# Patient Record
Sex: MALE | Race: WHITE | NOT HISPANIC OR LATINO | URBAN - METROPOLITAN AREA
[De-identification: names, ages, dates, MRNs, and addresses within clinical notes are randomized per-mention and may not be internally consistent; named-entity substitution may affect disease eponyms.]

---

## 2017-02-10 ENCOUNTER — EMERGENCY (EMERGENCY)
Facility: HOSPITAL | Age: 57
LOS: 1 days | End: 2017-02-10
Payer: MEDICARE

## 2017-02-10 PROCEDURE — 99283 EMERGENCY DEPT VISIT LOW MDM: CPT

## 2017-05-30 ENCOUNTER — OUTPATIENT (OUTPATIENT)
Dept: OUTPATIENT SERVICES | Facility: HOSPITAL | Age: 57
LOS: 1 days | End: 2017-05-30

## 2017-07-15 ENCOUNTER — EMERGENCY (EMERGENCY)
Facility: HOSPITAL | Age: 57
LOS: 1 days | End: 2017-07-15
Payer: MEDICARE

## 2017-07-15 PROCEDURE — 99282 EMERGENCY DEPT VISIT SF MDM: CPT

## 2017-08-08 ENCOUNTER — OUTPATIENT (OUTPATIENT)
Dept: OUTPATIENT SERVICES | Facility: HOSPITAL | Age: 57
LOS: 1 days | End: 2017-08-08

## 2017-09-08 ENCOUNTER — OUTPATIENT (OUTPATIENT)
Dept: OUTPATIENT SERVICES | Facility: HOSPITAL | Age: 57
LOS: 1 days | End: 2017-09-08

## 2018-01-23 ENCOUNTER — OUTPATIENT (OUTPATIENT)
Dept: OUTPATIENT SERVICES | Facility: HOSPITAL | Age: 58
LOS: 1 days | End: 2018-01-23

## 2018-03-07 ENCOUNTER — OUTPATIENT (OUTPATIENT)
Dept: OUTPATIENT SERVICES | Facility: HOSPITAL | Age: 58
LOS: 1 days | End: 2018-03-07

## 2018-03-20 ENCOUNTER — OUTPATIENT (OUTPATIENT)
Dept: OUTPATIENT SERVICES | Facility: HOSPITAL | Age: 58
LOS: 1 days | End: 2018-03-20

## 2018-06-15 ENCOUNTER — OUTPATIENT (OUTPATIENT)
Dept: OUTPATIENT SERVICES | Facility: HOSPITAL | Age: 58
LOS: 1 days | End: 2018-06-15

## 2018-06-25 ENCOUNTER — OUTPATIENT (OUTPATIENT)
Dept: OUTPATIENT SERVICES | Facility: HOSPITAL | Age: 58
LOS: 1 days | End: 2018-06-25
Payer: MEDICARE

## 2018-06-25 PROCEDURE — 76856 US EXAM PELVIC COMPLETE: CPT | Mod: 26

## 2018-06-25 PROCEDURE — 76770 US EXAM ABDO BACK WALL COMP: CPT | Mod: 26

## 2018-07-05 ENCOUNTER — OUTPATIENT (OUTPATIENT)
Dept: OUTPATIENT SERVICES | Facility: HOSPITAL | Age: 58
LOS: 1 days | End: 2018-07-05

## 2018-07-30 ENCOUNTER — OUTPATIENT (OUTPATIENT)
Dept: OUTPATIENT SERVICES | Facility: HOSPITAL | Age: 58
LOS: 1 days | End: 2018-07-30

## 2018-08-26 ENCOUNTER — EMERGENCY (EMERGENCY)
Facility: HOSPITAL | Age: 58
LOS: 1 days | End: 2018-08-26
Payer: MEDICARE

## 2018-08-26 PROCEDURE — 99284 EMERGENCY DEPT VISIT MOD MDM: CPT | Mod: 25

## 2018-08-26 PROCEDURE — 99283 EMERGENCY DEPT VISIT LOW MDM: CPT

## 2018-10-24 ENCOUNTER — EMERGENCY (EMERGENCY)
Facility: HOSPITAL | Age: 58
LOS: 1 days | End: 2018-10-24
Payer: MEDICARE

## 2018-10-24 PROCEDURE — 99284 EMERGENCY DEPT VISIT MOD MDM: CPT

## 2018-10-24 PROCEDURE — 70450 CT HEAD/BRAIN W/O DYE: CPT | Mod: 26

## 2018-11-02 ENCOUNTER — EMERGENCY (EMERGENCY)
Facility: HOSPITAL | Age: 58
LOS: 1 days | End: 2018-11-02
Payer: MEDICARE

## 2018-11-02 PROCEDURE — 71045 X-RAY EXAM CHEST 1 VIEW: CPT | Mod: 26

## 2018-11-02 PROCEDURE — 99284 EMERGENCY DEPT VISIT MOD MDM: CPT | Mod: GC

## 2018-11-09 ENCOUNTER — EMERGENCY (EMERGENCY)
Facility: HOSPITAL | Age: 58
LOS: 1 days | End: 2018-11-09
Payer: MEDICARE

## 2018-11-09 PROCEDURE — 70450 CT HEAD/BRAIN W/O DYE: CPT | Mod: 26

## 2018-11-09 PROCEDURE — 99284 EMERGENCY DEPT VISIT MOD MDM: CPT

## 2018-11-09 PROCEDURE — 71046 X-RAY EXAM CHEST 2 VIEWS: CPT | Mod: 26

## 2018-11-09 PROCEDURE — 90792 PSYCH DIAG EVAL W/MED SRVCS: CPT | Mod: GT

## 2018-11-09 PROCEDURE — 74018 RADEX ABDOMEN 1 VIEW: CPT | Mod: 26

## 2018-11-20 ENCOUNTER — OUTPATIENT (OUTPATIENT)
Dept: OUTPATIENT SERVICES | Facility: HOSPITAL | Age: 58
LOS: 1 days | End: 2018-11-20

## 2019-02-07 ENCOUNTER — OUTPATIENT (OUTPATIENT)
Dept: OUTPATIENT SERVICES | Facility: HOSPITAL | Age: 59
LOS: 1 days | End: 2019-02-07

## 2019-02-22 ENCOUNTER — OUTPATIENT (OUTPATIENT)
Dept: OUTPATIENT SERVICES | Facility: HOSPITAL | Age: 59
LOS: 1 days | End: 2019-02-22

## 2019-05-02 ENCOUNTER — OUTPATIENT (OUTPATIENT)
Dept: OUTPATIENT SERVICES | Facility: HOSPITAL | Age: 59
LOS: 1 days | End: 2019-05-02

## 2019-06-05 ENCOUNTER — OUTPATIENT (OUTPATIENT)
Dept: OUTPATIENT SERVICES | Facility: HOSPITAL | Age: 59
LOS: 1 days | End: 2019-06-05

## 2019-07-01 PROBLEM — Z00.00 ENCOUNTER FOR PREVENTIVE HEALTH EXAMINATION: Status: ACTIVE | Noted: 2019-07-01

## 2019-07-09 ENCOUNTER — APPOINTMENT (OUTPATIENT)
Dept: ULTRASOUND IMAGING | Facility: CLINIC | Age: 59
End: 2019-07-09
Payer: MEDICARE

## 2019-07-09 PROCEDURE — 76770 US EXAM ABDO BACK WALL COMP: CPT

## 2019-08-13 ENCOUNTER — OUTPATIENT (OUTPATIENT)
Dept: OUTPATIENT SERVICES | Facility: HOSPITAL | Age: 59
LOS: 1 days | End: 2019-08-13

## 2019-10-29 ENCOUNTER — APPOINTMENT (OUTPATIENT)
Dept: ULTRASOUND IMAGING | Facility: CLINIC | Age: 59
End: 2019-10-29
Payer: MEDICARE

## 2019-10-29 PROCEDURE — 76770 US EXAM ABDO BACK WALL COMP: CPT

## 2019-11-21 ENCOUNTER — OUTPATIENT (OUTPATIENT)
Dept: OUTPATIENT SERVICES | Facility: HOSPITAL | Age: 59
LOS: 1 days | Discharge: HOME | End: 2019-11-21

## 2019-12-06 ENCOUNTER — OUTPATIENT (OUTPATIENT)
Dept: OUTPATIENT SERVICES | Facility: HOSPITAL | Age: 59
LOS: 1 days | End: 2019-12-06

## 2020-01-06 ENCOUNTER — EMERGENCY (EMERGENCY)
Facility: HOSPITAL | Age: 60
LOS: 1 days | End: 2020-01-06
Admitting: EMERGENCY MEDICINE
Payer: MEDICARE

## 2020-01-06 PROCEDURE — 99283 EMERGENCY DEPT VISIT LOW MDM: CPT

## 2020-01-06 PROCEDURE — 71046 X-RAY EXAM CHEST 2 VIEWS: CPT | Mod: 26

## 2020-01-30 ENCOUNTER — APPOINTMENT (OUTPATIENT)
Dept: RADIOLOGY | Facility: CLINIC | Age: 60
End: 2020-01-30
Payer: MEDICARE

## 2020-01-30 PROCEDURE — 71046 X-RAY EXAM CHEST 2 VIEWS: CPT

## 2020-02-06 ENCOUNTER — OUTPATIENT (OUTPATIENT)
Dept: OUTPATIENT SERVICES | Facility: HOSPITAL | Age: 60
LOS: 1 days | End: 2020-02-06
Payer: MEDICARE

## 2020-02-06 PROCEDURE — 74230 X-RAY XM SWLNG FUNCJ C+: CPT | Mod: 26

## 2020-02-28 ENCOUNTER — EMERGENCY (EMERGENCY)
Facility: HOSPITAL | Age: 60
LOS: 1 days | End: 2020-02-28
Admitting: EMERGENCY MEDICINE
Payer: MEDICARE

## 2020-02-28 PROCEDURE — 71045 X-RAY EXAM CHEST 1 VIEW: CPT | Mod: 26

## 2020-02-28 PROCEDURE — 99283 EMERGENCY DEPT VISIT LOW MDM: CPT

## 2020-02-29 ENCOUNTER — EMERGENCY (EMERGENCY)
Facility: HOSPITAL | Age: 60
LOS: 1 days | End: 2020-02-29
Admitting: EMERGENCY MEDICINE
Payer: MEDICARE

## 2020-02-29 PROCEDURE — 99283 EMERGENCY DEPT VISIT LOW MDM: CPT

## 2020-02-29 PROCEDURE — 71046 X-RAY EXAM CHEST 2 VIEWS: CPT | Mod: 26

## 2020-03-04 ENCOUNTER — OUTPATIENT (OUTPATIENT)
Dept: OUTPATIENT SERVICES | Facility: HOSPITAL | Age: 60
LOS: 1 days | End: 2020-03-04

## 2020-08-05 ENCOUNTER — OUTPATIENT (OUTPATIENT)
Dept: OUTPATIENT SERVICES | Facility: HOSPITAL | Age: 60
LOS: 1 days | End: 2020-08-05

## 2020-08-20 ENCOUNTER — OUTPATIENT (OUTPATIENT)
Dept: OUTPATIENT SERVICES | Facility: HOSPITAL | Age: 60
LOS: 1 days | Discharge: HOME | End: 2020-08-20

## 2020-10-08 ENCOUNTER — OUTPATIENT (OUTPATIENT)
Dept: OUTPATIENT SERVICES | Facility: HOSPITAL | Age: 60
LOS: 1 days | End: 2020-10-08
Payer: MEDICARE

## 2020-10-08 PROCEDURE — 74230 X-RAY XM SWLNG FUNCJ C+: CPT | Mod: 26

## 2020-10-19 ENCOUNTER — OUTPATIENT (OUTPATIENT)
Dept: OUTPATIENT SERVICES | Facility: HOSPITAL | Age: 60
LOS: 1 days | End: 2020-10-19

## 2021-01-07 ENCOUNTER — OUTPATIENT (OUTPATIENT)
Dept: OUTPATIENT SERVICES | Facility: HOSPITAL | Age: 61
LOS: 1 days | End: 2021-01-07

## 2021-01-08 ENCOUNTER — OUTPATIENT (OUTPATIENT)
Dept: OUTPATIENT SERVICES | Facility: HOSPITAL | Age: 61
LOS: 1 days | End: 2021-01-08
Payer: MEDICARE

## 2021-01-08 PROCEDURE — 71046 X-RAY EXAM CHEST 2 VIEWS: CPT | Mod: 26

## 2021-01-10 ENCOUNTER — APPOINTMENT (OUTPATIENT)
Dept: DISASTER EMERGENCY | Facility: CLINIC | Age: 61
End: 2021-01-10

## 2021-01-10 DIAGNOSIS — Z01.818 ENCOUNTER FOR OTHER PREPROCEDURAL EXAMINATION: ICD-10-CM

## 2021-01-12 ENCOUNTER — OUTPATIENT (OUTPATIENT)
Dept: OUTPATIENT SERVICES | Facility: HOSPITAL | Age: 61
LOS: 1 days | End: 2021-01-12

## 2021-01-13 ENCOUNTER — OUTPATIENT (OUTPATIENT)
Dept: OUTPATIENT SERVICES | Facility: HOSPITAL | Age: 61
LOS: 1 days | End: 2021-01-13

## 2021-01-19 ENCOUNTER — APPOINTMENT (OUTPATIENT)
Dept: ULTRASOUND IMAGING | Facility: CLINIC | Age: 61
End: 2021-01-19
Payer: MEDICARE

## 2021-01-19 PROCEDURE — 76770 US EXAM ABDO BACK WALL COMP: CPT

## 2021-02-05 ENCOUNTER — OUTPATIENT (OUTPATIENT)
Dept: OUTPATIENT SERVICES | Facility: HOSPITAL | Age: 61
LOS: 1 days | End: 2021-02-05

## 2021-02-10 ENCOUNTER — APPOINTMENT (OUTPATIENT)
Dept: MRI IMAGING | Facility: CLINIC | Age: 61
End: 2021-02-10

## 2021-02-10 ENCOUNTER — APPOINTMENT (OUTPATIENT)
Dept: ULTRASOUND IMAGING | Facility: CLINIC | Age: 61
End: 2021-02-10
Payer: MEDICARE

## 2021-02-10 PROCEDURE — 70551 MRI BRAIN STEM W/O DYE: CPT | Mod: MH

## 2021-02-10 PROCEDURE — 76705 ECHO EXAM OF ABDOMEN: CPT

## 2021-03-05 ENCOUNTER — OUTPATIENT (OUTPATIENT)
Dept: OUTPATIENT SERVICES | Facility: HOSPITAL | Age: 61
LOS: 1 days | End: 2021-03-05

## 2021-03-05 ENCOUNTER — INPATIENT (INPATIENT)
Facility: HOSPITAL | Age: 61
LOS: 4 days | Discharge: HOME | End: 2021-03-10
Payer: MEDICARE

## 2021-03-05 PROCEDURE — 99284 EMERGENCY DEPT VISIT MOD MDM: CPT | Mod: CS

## 2021-03-05 PROCEDURE — 93010 ELECTROCARDIOGRAM REPORT: CPT

## 2021-03-05 PROCEDURE — 71045 X-RAY EXAM CHEST 1 VIEW: CPT | Mod: 26

## 2021-03-06 ENCOUNTER — OUTPATIENT (OUTPATIENT)
Dept: OUTPATIENT SERVICES | Facility: HOSPITAL | Age: 61
LOS: 1 days | End: 2021-03-06

## 2021-03-07 ENCOUNTER — OUTPATIENT (OUTPATIENT)
Dept: OUTPATIENT SERVICES | Facility: HOSPITAL | Age: 61
LOS: 1 days | End: 2021-03-07

## 2021-03-08 ENCOUNTER — OUTPATIENT (OUTPATIENT)
Dept: OUTPATIENT SERVICES | Facility: HOSPITAL | Age: 61
LOS: 1 days | End: 2021-03-08

## 2021-03-08 PROCEDURE — 74230 X-RAY XM SWLNG FUNCJ C+: CPT | Mod: 26

## 2021-03-09 ENCOUNTER — OUTPATIENT (OUTPATIENT)
Dept: OUTPATIENT SERVICES | Facility: HOSPITAL | Age: 61
LOS: 1 days | End: 2021-03-09

## 2021-03-10 ENCOUNTER — OUTPATIENT (OUTPATIENT)
Dept: OUTPATIENT SERVICES | Facility: HOSPITAL | Age: 61
LOS: 1 days | End: 2021-03-10

## 2021-05-18 ENCOUNTER — OUTPATIENT (OUTPATIENT)
Dept: OUTPATIENT SERVICES | Facility: HOSPITAL | Age: 61
LOS: 1 days | End: 2021-05-18

## 2021-08-04 ENCOUNTER — OUTPATIENT (OUTPATIENT)
Dept: OUTPATIENT SERVICES | Facility: HOSPITAL | Age: 61
LOS: 1 days | End: 2021-08-04

## 2021-08-20 ENCOUNTER — OUTPATIENT (OUTPATIENT)
Dept: OUTPATIENT SERVICES | Facility: HOSPITAL | Age: 61
LOS: 1 days | End: 2021-08-20

## 2021-10-18 ENCOUNTER — OUTPATIENT (OUTPATIENT)
Dept: OUTPATIENT SERVICES | Facility: HOSPITAL | Age: 61
LOS: 1 days | End: 2021-10-18

## 2021-10-20 ENCOUNTER — OUTPATIENT (OUTPATIENT)
Dept: OUTPATIENT SERVICES | Facility: HOSPITAL | Age: 61
LOS: 1 days | End: 2021-10-20

## 2021-10-20 ENCOUNTER — OUTPATIENT (OUTPATIENT)
Dept: OUTPATIENT SERVICES | Facility: HOSPITAL | Age: 61
LOS: 1 days | Discharge: HOME | End: 2021-10-20

## 2022-02-07 ENCOUNTER — APPOINTMENT (OUTPATIENT)
Dept: ULTRASOUND IMAGING | Facility: CLINIC | Age: 62
End: 2022-02-07
Payer: MEDICARE

## 2022-02-07 PROCEDURE — 93975 VASCULAR STUDY: CPT

## 2022-03-02 ENCOUNTER — OUTPATIENT (OUTPATIENT)
Dept: OUTPATIENT SERVICES | Facility: HOSPITAL | Age: 62
LOS: 1 days | End: 2022-03-02

## 2022-03-02 DIAGNOSIS — F31.9 BIPOLAR DISORDER, UNSPECIFIED: ICD-10-CM

## 2022-06-02 ENCOUNTER — OUTPATIENT (OUTPATIENT)
Dept: OUTPATIENT SERVICES | Facility: HOSPITAL | Age: 62
LOS: 1 days | End: 2022-06-02

## 2022-06-02 DIAGNOSIS — F63.9 IMPULSE DISORDER, UNSPECIFIED: ICD-10-CM

## 2022-06-24 ENCOUNTER — EMERGENCY (EMERGENCY)
Facility: HOSPITAL | Age: 62
LOS: 1 days | Discharge: HOME | End: 2022-06-24
Admitting: EMERGENCY MEDICINE
Payer: MEDICARE

## 2022-06-24 DIAGNOSIS — S05.02XA INJURY OF CONJUNCTIVA AND CORNEAL ABRASION WITHOUT FOREIGN BODY, LEFT EYE, INITIAL ENCOUNTER: ICD-10-CM

## 2022-06-24 DIAGNOSIS — F73 PROFOUND INTELLECTUAL DISABILITIES: ICD-10-CM

## 2022-06-24 DIAGNOSIS — S09.90XA UNSPECIFIED INJURY OF HEAD, INITIAL ENCOUNTER: ICD-10-CM

## 2022-06-24 DIAGNOSIS — S09.8XXA OTHER SPECIFIED INJURIES OF HEAD, INITIAL ENCOUNTER: ICD-10-CM

## 2022-06-24 DIAGNOSIS — E04.1 NONTOXIC SINGLE THYROID NODULE: ICD-10-CM
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
## 2022-06-24 PROCEDURE — 70486 CT MAXILLOFACIAL W/O DYE: CPT | Mod: 26,MA

## 2022-06-24 PROCEDURE — 72125 CT NECK SPINE W/O DYE: CPT | Mod: 26,MA

## 2022-06-24 PROCEDURE — 70450 CT HEAD/BRAIN W/O DYE: CPT | Mod: 26,MA

## 2022-06-24 PROCEDURE — 99284 EMERGENCY DEPT VISIT MOD MDM: CPT | Mod: FS

## 2022-07-09 ENCOUNTER — OUTPATIENT (OUTPATIENT)
Dept: OUTPATIENT SERVICES | Facility: HOSPITAL | Age: 62
LOS: 1 days | End: 2022-07-09

## 2022-07-09 DIAGNOSIS — F63.9 IMPULSE DISORDER, UNSPECIFIED: ICD-10-CM

## 2022-08-10 ENCOUNTER — APPOINTMENT (OUTPATIENT)
Dept: ULTRASOUND IMAGING | Facility: CLINIC | Age: 62
End: 2022-08-10

## 2022-08-10 PROCEDURE — 76536 US EXAM OF HEAD AND NECK: CPT

## 2023-02-01 ENCOUNTER — APPOINTMENT (OUTPATIENT)
Dept: ULTRASOUND IMAGING | Facility: CLINIC | Age: 63
End: 2023-02-01
Payer: MEDICARE

## 2023-02-01 PROCEDURE — 76536 US EXAM OF HEAD AND NECK: CPT

## 2023-03-06 ENCOUNTER — APPOINTMENT (OUTPATIENT)
Dept: ULTRASOUND IMAGING | Facility: CLINIC | Age: 63
End: 2023-03-06

## 2023-05-11 ENCOUNTER — APPOINTMENT (OUTPATIENT)
Dept: ULTRASOUND IMAGING | Facility: CLINIC | Age: 63
End: 2023-05-11

## 2023-05-31 ENCOUNTER — OUTPATIENT (OUTPATIENT)
Dept: OUTPATIENT SERVICES | Facility: HOSPITAL | Age: 63
LOS: 1 days | End: 2023-05-31
Payer: MEDICARE

## 2023-05-31 VITALS
RESPIRATION RATE: 18 BRPM | HEIGHT: 64.5 IN | SYSTOLIC BLOOD PRESSURE: 108 MMHG | WEIGHT: 158.95 LBS | TEMPERATURE: 99 F | OXYGEN SATURATION: 97 % | DIASTOLIC BLOOD PRESSURE: 73 MMHG | HEART RATE: 67 BPM

## 2023-05-31 DIAGNOSIS — Z98.890 OTHER SPECIFIED POSTPROCEDURAL STATES: Chronic | ICD-10-CM

## 2023-05-31 DIAGNOSIS — K05.6 PERIODONTAL DISEASE, UNSPECIFIED: ICD-10-CM

## 2023-05-31 DIAGNOSIS — Z01.818 ENCOUNTER FOR OTHER PREPROCEDURAL EXAMINATION: ICD-10-CM

## 2023-05-31 DIAGNOSIS — K02.62 DENTAL CARIES ON SMOOTH SURFACE PENETRATING INTO DENTIN: ICD-10-CM

## 2023-05-31 LAB
ANION GAP SERPL CALC-SCNC: 13 MMOL/L — SIGNIFICANT CHANGE UP (ref 5–17)
BUN SERPL-MCNC: 19 MG/DL — SIGNIFICANT CHANGE UP (ref 7–23)
CALCIUM SERPL-MCNC: 10.6 MG/DL — HIGH (ref 8.4–10.5)
CHLORIDE SERPL-SCNC: 103 MMOL/L — SIGNIFICANT CHANGE UP (ref 96–108)
CO2 SERPL-SCNC: 22 MMOL/L — SIGNIFICANT CHANGE UP (ref 22–31)
CREAT SERPL-MCNC: 0.89 MG/DL — SIGNIFICANT CHANGE UP (ref 0.5–1.3)
EGFR: 96 ML/MIN/1.73M2 — SIGNIFICANT CHANGE UP
GLUCOSE SERPL-MCNC: 78 MG/DL — SIGNIFICANT CHANGE UP (ref 70–99)
HCT VFR BLD CALC: 47.4 % — SIGNIFICANT CHANGE UP (ref 39–50)
HGB BLD-MCNC: 15.5 G/DL — SIGNIFICANT CHANGE UP (ref 13–17)
MCHC RBC-ENTMCNC: 31.5 PG — SIGNIFICANT CHANGE UP (ref 27–34)
MCHC RBC-ENTMCNC: 32.7 GM/DL — SIGNIFICANT CHANGE UP (ref 32–36)
MCV RBC AUTO: 96.3 FL — SIGNIFICANT CHANGE UP (ref 80–100)
NRBC # BLD: 0 /100 WBCS — SIGNIFICANT CHANGE UP (ref 0–0)
PLATELET # BLD AUTO: 201 K/UL — SIGNIFICANT CHANGE UP (ref 150–400)
POTASSIUM SERPL-MCNC: 3.9 MMOL/L — SIGNIFICANT CHANGE UP (ref 3.5–5.3)
POTASSIUM SERPL-SCNC: 3.9 MMOL/L — SIGNIFICANT CHANGE UP (ref 3.5–5.3)
RBC # BLD: 4.92 M/UL — SIGNIFICANT CHANGE UP (ref 4.2–5.8)
RBC # FLD: 12.7 % — SIGNIFICANT CHANGE UP (ref 10.3–14.5)
SODIUM SERPL-SCNC: 138 MMOL/L — SIGNIFICANT CHANGE UP (ref 135–145)
WBC # BLD: 8.06 K/UL — SIGNIFICANT CHANGE UP (ref 3.8–10.5)
WBC # FLD AUTO: 8.06 K/UL — SIGNIFICANT CHANGE UP (ref 3.8–10.5)

## 2023-05-31 PROCEDURE — 80048 BASIC METABOLIC PNL TOTAL CA: CPT

## 2023-05-31 PROCEDURE — 85027 COMPLETE CBC AUTOMATED: CPT

## 2023-05-31 PROCEDURE — G0463: CPT

## 2023-05-31 RX ORDER — SODIUM CHLORIDE 9 MG/ML
3 INJECTION INTRAMUSCULAR; INTRAVENOUS; SUBCUTANEOUS EVERY 8 HOURS
Refills: 0 | Status: DISCONTINUED | OUTPATIENT
Start: 2023-06-21 | End: 2023-07-05

## 2023-05-31 RX ORDER — LIDOCAINE HCL 20 MG/ML
0.2 VIAL (ML) INJECTION ONCE
Refills: 0 | Status: DISCONTINUED | OUTPATIENT
Start: 2023-06-21 | End: 2023-07-05

## 2023-05-31 NOTE — H&P PST ADULT - NSICDXPASTMEDICALHX_GEN_ALL_CORE_FT
PAST MEDICAL HISTORY:  Autism     Bipolar disorder     BPH (benign prostatic hyperplasia)     Dental caries on smooth surface penetrating into dentin     Impulse control disorder     Lyme disease     Moderate intellectual disability      PAST MEDICAL HISTORY:  Autism     Bipolar disorder     BPH (benign prostatic hyperplasia)     Dental caries on smooth surface penetrating into dentin     Impulse control disorder     Lyme disease     Moderate intellectual disability     Multiple thyroid nodules     Perforated tympanic membrane, right

## 2023-05-31 NOTE — H&P PST ADULT - ASSESSMENT
DASI score: 5.72 mets   DASI activity: able to climb 1 flight, walk 4 blocks  Loose teeth or denture: no

## 2023-05-31 NOTE — H&P PST ADULT - NSANTHOSAYNRD_GEN_A_CORE
No. MINDA screening performed.  STOP BANG Legend: 0-2 = LOW Risk; 3-4 = INTERMEDIATE Risk; 5-8 = HIGH Risk

## 2023-05-31 NOTE — H&P PST ADULT - HISTORY OF PRESENT ILLNESS
64 yo M with PMHx significant for Intellectual Disability, Autism, Impulse Control Disorder, Bipolar Disorder, BPH, and Lyme disease who presents to [facility] prior to scheduled Comprehensive Dental Treatment under Anesthesia on 6/21/23.  62 yo M with PMHx significant for Intellectual Disability, Autism, Impulse Control Disorder, Bipolar Disorder, BPH, Thyroid nodules, sp Colonoscopy, and Lyme disease who presents to PST prior to scheduled Comprehensive Dental Treatment under Anesthesia on 6/21/23.  64 yo M with PMHx significant for Intellectual Disability, Autism, Impulse Control Disorder, Bipolar Disorder, BPH, Thyroid nodules, sp Colonoscopy, and Lyme disease who presents to PST prior to scheduled Comprehensive Dental Treatment under Anesthesia on 6/21/23.     Of note, patient is on thickened liquid diet; CXR prescription faxed to [facility] ().  62 yo M with PMHx significant for Intellectual Disability, Autism, Impulse Control Disorder, Bipolar Disorder, BPH, Thyroid nodules, sp Colonoscopy, and Lyme disease who presents to PST prior to scheduled Comprehensive Dental Treatment under Anesthesia on 6/21/23.     Of note, patient is on thickened liquid diet; CXR prescription faxed to [facility] () and confirmed it will be done prior to dental procedure.

## 2023-06-01 RX ORDER — CEFAZOLIN SODIUM 1 G
2000 VIAL (EA) INJECTION ONCE
Refills: 0 | Status: COMPLETED | OUTPATIENT
Start: 2023-06-21 | End: 2023-06-21

## 2023-06-08 PROBLEM — F84.0 AUTISTIC DISORDER: Chronic | Status: ACTIVE | Noted: 2023-05-31

## 2023-06-08 PROBLEM — N40.0 BENIGN PROSTATIC HYPERPLASIA WITHOUT LOWER URINARY TRACT SYMPTOMS: Chronic | Status: ACTIVE | Noted: 2023-05-31

## 2023-06-08 PROBLEM — K02.62 DENTAL CARIES ON SMOOTH SURFACE PENETRATING INTO DENTIN: Chronic | Status: ACTIVE | Noted: 2023-05-31

## 2023-06-08 PROBLEM — F31.9 BIPOLAR DISORDER, UNSPECIFIED: Chronic | Status: ACTIVE | Noted: 2023-05-31

## 2023-06-08 PROBLEM — H72.91 UNSPECIFIED PERFORATION OF TYMPANIC MEMBRANE, RIGHT EAR: Chronic | Status: ACTIVE | Noted: 2023-05-31

## 2023-06-08 PROBLEM — F71 MODERATE INTELLECTUAL DISABILITIES: Chronic | Status: ACTIVE | Noted: 2023-05-31

## 2023-06-08 PROBLEM — F63.9 IMPULSE DISORDER, UNSPECIFIED: Chronic | Status: ACTIVE | Noted: 2023-05-31

## 2023-06-08 PROBLEM — E04.2 NONTOXIC MULTINODULAR GOITER: Chronic | Status: ACTIVE | Noted: 2023-05-31

## 2023-06-08 PROBLEM — A69.20 LYME DISEASE, UNSPECIFIED: Chronic | Status: ACTIVE | Noted: 2023-05-31

## 2023-06-09 ENCOUNTER — APPOINTMENT (OUTPATIENT)
Dept: RADIOLOGY | Facility: CLINIC | Age: 63
End: 2023-06-09
Payer: MEDICARE

## 2023-06-09 PROCEDURE — 71046 X-RAY EXAM CHEST 2 VIEWS: CPT

## 2023-06-20 ENCOUNTER — TRANSCRIPTION ENCOUNTER (OUTPATIENT)
Age: 63
End: 2023-06-20

## 2023-06-21 ENCOUNTER — OUTPATIENT (OUTPATIENT)
Dept: OUTPATIENT SERVICES | Facility: HOSPITAL | Age: 63
LOS: 1 days | End: 2023-06-21
Payer: MEDICARE

## 2023-06-21 ENCOUNTER — TRANSCRIPTION ENCOUNTER (OUTPATIENT)
Age: 63
End: 2023-06-21

## 2023-06-21 VITALS
HEART RATE: 83 BPM | TEMPERATURE: 97 F | DIASTOLIC BLOOD PRESSURE: 88 MMHG | SYSTOLIC BLOOD PRESSURE: 147 MMHG | OXYGEN SATURATION: 98 % | RESPIRATION RATE: 16 BRPM

## 2023-06-21 VITALS
SYSTOLIC BLOOD PRESSURE: 123 MMHG | WEIGHT: 143.3 LBS | TEMPERATURE: 98 F | RESPIRATION RATE: 18 BRPM | HEART RATE: 87 BPM | OXYGEN SATURATION: 99 % | DIASTOLIC BLOOD PRESSURE: 76 MMHG | HEIGHT: 74.02 IN

## 2023-06-21 DIAGNOSIS — K02.62 DENTAL CARIES ON SMOOTH SURFACE PENETRATING INTO DENTIN: ICD-10-CM

## 2023-06-21 DIAGNOSIS — K05.6 PERIODONTAL DISEASE, UNSPECIFIED: ICD-10-CM

## 2023-06-21 DIAGNOSIS — Z98.890 OTHER SPECIFIED POSTPROCEDURAL STATES: Chronic | ICD-10-CM

## 2023-06-21 PROCEDURE — C1889: CPT

## 2023-06-21 PROCEDURE — C9399: CPT

## 2023-06-21 PROCEDURE — D1110: CPT

## 2023-06-21 PROCEDURE — D7210: CPT

## 2023-06-21 DEVICE — SURGICEL 4 X 8": Type: IMPLANTABLE DEVICE | Status: FUNCTIONAL

## 2023-06-21 RX ORDER — OXYCODONE HYDROCHLORIDE 5 MG/1
10 TABLET ORAL ONCE
Refills: 0 | Status: DISCONTINUED | OUTPATIENT
Start: 2023-06-21 | End: 2023-06-21

## 2023-06-21 RX ORDER — ACETAMINOPHEN 500 MG
100 TABLET ORAL
Qty: 0 | Refills: 0 | DISCHARGE
Start: 2023-06-21

## 2023-06-21 RX ORDER — FENTANYL CITRATE 50 UG/ML
50 INJECTION INTRAVENOUS
Refills: 0 | Status: DISCONTINUED | OUTPATIENT
Start: 2023-06-21 | End: 2023-06-21

## 2023-06-21 RX ORDER — OXYCODONE HYDROCHLORIDE 5 MG/1
5 TABLET ORAL ONCE
Refills: 0 | Status: DISCONTINUED | OUTPATIENT
Start: 2023-06-21 | End: 2023-06-21

## 2023-06-21 RX ORDER — CEFAZOLIN SODIUM 1 G
2 VIAL (EA) INJECTION
Qty: 0 | Refills: 0 | DISCHARGE
Start: 2023-06-21

## 2023-06-21 RX ORDER — ACETAMINOPHEN 500 MG
1000 TABLET ORAL ONCE
Refills: 0 | Status: DISCONTINUED | OUTPATIENT
Start: 2023-06-21 | End: 2023-06-21

## 2023-06-21 RX ORDER — ONDANSETRON 8 MG/1
4 TABLET, FILM COATED ORAL ONCE
Refills: 0 | Status: DISCONTINUED | OUTPATIENT
Start: 2023-06-21 | End: 2023-06-21

## 2023-06-21 NOTE — ASU DISCHARGE PLAN (ADULT/PEDIATRIC) - ASU DC SPECIAL INSTRUCTIONSFT
comprehensive dental treatment under general anesthesia stage 1: exam, xrays, periodontal treatment and extractions performed three to the lower anterior and two to the upper left and upper right     no straw for two days and keep head elevated for the nest two days and nights, return to activities on Friday    see Dr Lo on Monday 6/26 at 11:45 am for a follow up at [facility] in [place], appt is set    tylenol prn pain and take tylenol prn pain      patient has extensive dental decay     if there is any bleeding, put pressure with gauze for 20 minutes     patient will need Stage 2 in the near future     patient does have extensive dental caries

## 2023-06-21 NOTE — ASU PATIENT PROFILE, ADULT - NSTOBACCONEVERSMOKERY/N_GEN_A
"1) Jacque \"Low back pain stretch video\".    2) Cut down to 24 beers per week.    3) Exercise 3-5 times per week for at least 30 minutes.  "
No

## 2023-06-21 NOTE — ASU DISCHARGE PLAN (ADULT/PEDIATRIC) - NS MD DC FALL RISK RISK
For information on Fall & Injury Prevention, visit: [email or web address] OR  [email or web address] OR  https://www.cdc.gov/steadi/patient.html

## 2023-06-21 NOTE — ASU PATIENT PROFILE, ADULT - FALL HARM RISK - HARM RISK INTERVENTIONS
Assistance with ambulation/Assistance OOB with selected safe patient handling equipment/Communicate Risk of Fall with Harm to all staff/Discuss with provider need for PT consult/Monitor for mental status changes/Monitor gait and stability/Move patient closer to nurses' station/Reinforce activity limits and safety measures with patient and family/Reorient to person, place and time as needed/Tailored Fall Risk Interventions/Toileting schedule using arm’s reach rule for commode and bathroom/Use of alarms - bed, chair and/or voice tab/Visual Cue: Yellow wristband and red socks/Bed in lowest position, wheels locked, appropriate side rails in place/Call bell, personal items and telephone in reach/Instruct patient to call for assistance before getting out of bed or chair/Non-slip footwear when patient is out of bed/[place] to call system/Physically safe environment - no spills, clutter or unnecessary equipment/Purposeful Proactive Rounding/Room/bathroom lighting operational, light cord in reach

## 2023-06-21 NOTE — ASU PATIENT PROFILE, ADULT - PRO INTERPRETER NEED 2
How Severe Is Your Skin Lesion?: moderate Have Your Skin Lesions Been Treated?: not been treated Is This A New Presentation, Or A Follow-Up?: Growth English

## 2023-06-21 NOTE — ASU PATIENT PROFILE, ADULT - NSICDXPASTMEDICALHX_GEN_ALL_CORE_FT
PAST MEDICAL HISTORY:  Autism     Bipolar disorder     BPH (benign prostatic hyperplasia)     Dental caries on smooth surface penetrating into dentin     Impulse control disorder     Lyme disease     Moderate intellectual disability     Multiple thyroid nodules     Perforated tympanic membrane, right

## 2023-07-14 ENCOUNTER — OUTPATIENT (OUTPATIENT)
Dept: OUTPATIENT SERVICES | Facility: HOSPITAL | Age: 63
LOS: 1 days | End: 2023-07-14
Payer: MEDICARE

## 2023-07-14 VITALS
HEIGHT: 63 IN | WEIGHT: 154.98 LBS | TEMPERATURE: 99 F | RESPIRATION RATE: 15 BRPM | SYSTOLIC BLOOD PRESSURE: 114 MMHG | HEART RATE: 80 BPM | OXYGEN SATURATION: 97 % | DIASTOLIC BLOOD PRESSURE: 72 MMHG

## 2023-07-14 DIAGNOSIS — Z98.890 OTHER SPECIFIED POSTPROCEDURAL STATES: Chronic | ICD-10-CM

## 2023-07-14 DIAGNOSIS — K02.62 DENTAL CARIES ON SMOOTH SURFACE PENETRATING INTO DENTIN: ICD-10-CM

## 2023-07-14 DIAGNOSIS — K05.6 PERIODONTAL DISEASE, UNSPECIFIED: ICD-10-CM

## 2023-07-14 DIAGNOSIS — Z01.818 ENCOUNTER FOR OTHER PREPROCEDURAL EXAMINATION: ICD-10-CM

## 2023-07-14 PROCEDURE — G0463: CPT

## 2023-07-14 NOTE — H&P PST ADULT - NS HP PST LATEX ALLERGY
Next appt 4/15/2020  Last appt 1/14/2020    Refill request for   Disp Refills Start End    meloxicam (MOBIC) 15 MG tablet 30 tablet 0 1/14/2020     Sig - Route: Take 1 tablet by mouth daily       Disp Refills Start End    ADVAIR DISKUS 250-50 MCG/DOSE inhaler 60 each 0 1/7/2020     Sig: INHALE 1 PUFF BY MOUTH TWICE DAILY      Refill unable to be completed per standing protocol due to; Allergy/interaction  Orders pended, and routed to provider for approval.   Please route any notes back to your nursing pool via patient call NOT Rx Auth.  Thank you, Refill Center Staff        Also requested:   Disp Refills Start End    pantoprazole (PROTONIX) 40 MG tablet 90 tablet 0 12/4/2019     Sig - Route: TAKE 1 TABLET BY MOUTH DAILY       Refilled per protocol.   No

## 2023-07-14 NOTE — H&P PST ADULT - HISTORY OF PRESENT ILLNESS
64 yo M with PMHx significant for Intellectual Disability, Autism, Impulse Control Disorder, Bipolar Disorder, BPH, Thyroid nodules, sp Colonoscopy, and Lyme disease who presents to PST prior to scheduled Comprehensive Dental Treatment under Anesthesia on 6/21/23.     Of note, patient is on thickened liquid diet; CXR prescription faxed to [facility] () and confirmed it will be done prior to dental procedure.  64 yo M with PMHx significant for Intellectual Disability, Autism, Impulse Control Disorder, Bipolar Disorder, BPH, Thyroid nodules, sp Colonoscopy, and Lyme disease who presents to [facility] prior to scheduled Comprehensive Dental Treatment under Anesthesia on 6/21/23.   7/14/2023: addendum: pt presents to [facility] accompanied by group [place] staff, that above mentioned procedure was done without complication on 6/21/2023, now pt is scheduled for stage II dental procedure on 7/20.  discussed case with Dr. Munoz, no need for another set of blood work nor CXR.*    *legal guardian is sister Esme Schroeder  [phone] who will give telephone consent for anesthesia. *

## 2023-07-14 NOTE — H&P PST ADULT - BIRTH SEX
Genitourinary: Negative for dysuria, flank pain, frequency and urgency. Musculoskeletal: Positive for arthralgias. Negative for back pain, joint swelling and myalgias. Skin: Positive for wound. Negative for pallor and rash. Allergic/Immunologic: Negative for environmental allergies and food allergies. Neurological: Negative for dizziness, weakness, numbness and headaches. Hematological: Negative for adenopathy. Does not bruise/bleed easily. Psychiatric/Behavioral: Negative for agitation, confusion and suicidal ideas. PAST MEDICAL HISTORY   History reviewed. No pertinent past medical history. SURGICAL HISTORY     Patient  has a past surgical history that includes Appendectomy and Small intestine surgery. CURRENT MEDICATIONS       Previous Medications    ATORVASTATIN (LIPITOR) 20 MG TABLET    Take 1 tablet by mouth daily    MULTIPLE VITAMINS-MINERALS (THERAPEUTIC MULTIVITAMIN-MINERALS) TABLET    Take 1 tablet by mouth daily as needed. ALLERGIES     Patient is is allergic to asa [aspirin] and pcn [penicillins]. FAMILY HISTORY     Patient'sfamily history includes Diabetes in his father. SOCIAL HISTORY     Patient  reports that he has been smoking Cigarettes. He has been smoking about 0.25 packs per day. His smokeless tobacco use includes Snuff. He reports that he does not drink alcohol or use drugs. PHYSICAL EXAM     ED TRIAGE VITALS  BP: 136/77, Temp: 98.2 °F (36.8 °C), Pulse: 80, Resp: 16, SpO2: 96 %  Physical Exam   Constitutional: He is oriented to person, place, and time. He appears well-developed and well-nourished. No distress. HENT:   Head: Normocephalic. Nose: Nose normal.   Mouth/Throat: Oropharynx is clear and moist.   Eyes: Right eye exhibits no discharge. Left eye exhibits no discharge. No scleral icterus. Neck: Normal range of motion. Cardiovascular: Normal rate, regular rhythm, normal heart sounds and intact distal pulses.     Pulmonary/Chest: Effort normal
Male

## 2023-07-25 ENCOUNTER — TRANSCRIPTION ENCOUNTER (OUTPATIENT)
Age: 63
End: 2023-07-25

## 2023-07-26 ENCOUNTER — OUTPATIENT (OUTPATIENT)
Dept: INPATIENT UNIT | Facility: HOSPITAL | Age: 63
LOS: 1 days | End: 2023-07-26
Payer: MEDICARE

## 2023-07-26 ENCOUNTER — TRANSCRIPTION ENCOUNTER (OUTPATIENT)
Age: 63
End: 2023-07-26

## 2023-07-26 VITALS
SYSTOLIC BLOOD PRESSURE: 119 MMHG | RESPIRATION RATE: 18 BRPM | HEART RATE: 86 BPM | OXYGEN SATURATION: 97 % | TEMPERATURE: 98 F | HEIGHT: 60.98 IN | DIASTOLIC BLOOD PRESSURE: 68 MMHG | WEIGHT: 154.98 LBS

## 2023-07-26 VITALS
DIASTOLIC BLOOD PRESSURE: 62 MMHG | OXYGEN SATURATION: 94 % | HEART RATE: 99 BPM | RESPIRATION RATE: 20 BRPM | SYSTOLIC BLOOD PRESSURE: 140 MMHG | TEMPERATURE: 97 F

## 2023-07-26 DIAGNOSIS — K05.6 PERIODONTAL DISEASE, UNSPECIFIED: ICD-10-CM

## 2023-07-26 DIAGNOSIS — Z98.890 OTHER SPECIFIED POSTPROCEDURAL STATES: Chronic | ICD-10-CM

## 2023-07-26 DIAGNOSIS — K02.62 DENTAL CARIES ON SMOOTH SURFACE PENETRATING INTO DENTIN: ICD-10-CM

## 2023-07-26 PROCEDURE — C9399: CPT

## 2023-07-26 PROCEDURE — C1889: CPT

## 2023-07-26 PROCEDURE — D7210: CPT

## 2023-07-26 DEVICE — SURGICEL 2 X 14": Type: IMPLANTABLE DEVICE | Status: FUNCTIONAL

## 2023-07-26 RX ORDER — CEFAZOLIN SODIUM 1 G
2000 VIAL (EA) INJECTION ONCE
Refills: 0 | Status: COMPLETED | OUTPATIENT
Start: 2023-07-26 | End: 2023-07-26

## 2023-07-26 RX ORDER — OXYCODONE HYDROCHLORIDE 5 MG/1
10 TABLET ORAL ONCE
Refills: 0 | Status: DISCONTINUED | OUTPATIENT
Start: 2023-07-26 | End: 2023-07-26

## 2023-07-26 RX ORDER — FENTANYL CITRATE 50 UG/ML
50 INJECTION INTRAVENOUS
Refills: 0 | Status: DISCONTINUED | OUTPATIENT
Start: 2023-07-26 | End: 2023-07-26

## 2023-07-26 RX ORDER — ONDANSETRON 8 MG/1
4 TABLET, FILM COATED ORAL ONCE
Refills: 0 | Status: DISCONTINUED | OUTPATIENT
Start: 2023-07-26 | End: 2023-07-26

## 2023-07-26 RX ORDER — SODIUM CHLORIDE 9 MG/ML
3 INJECTION INTRAMUSCULAR; INTRAVENOUS; SUBCUTANEOUS EVERY 8 HOURS
Refills: 0 | Status: DISCONTINUED | OUTPATIENT
Start: 2023-07-26 | End: 2023-07-26

## 2023-07-26 RX ORDER — ACETAMINOPHEN 500 MG
1000 TABLET ORAL ONCE
Refills: 0 | Status: DISCONTINUED | OUTPATIENT
Start: 2023-07-26 | End: 2023-07-26

## 2023-07-26 RX ORDER — OXYCODONE HYDROCHLORIDE 5 MG/1
5 TABLET ORAL ONCE
Refills: 0 | Status: DISCONTINUED | OUTPATIENT
Start: 2023-07-26 | End: 2023-07-26

## 2023-07-26 NOTE — ASU PATIENT PROFILE, ADULT - FALL HARM RISK - HARM RISK INTERVENTIONS
Assistance with ambulation/Assistance OOB with selected safe patient handling equipment/Communicate Risk of Fall with Harm to all staff/Discuss with provider need for PT consult/Monitor for mental status changes/Monitor gait and stability/Move patient closer to nurses' station/Reinforce activity limits and safety measures with patient and family/Reorient to person, place and time as needed/Tailored Fall Risk Interventions/Toileting schedule using arm’s reach rule for commode and bathroom/Use of alarms - bed, chair and/or voice tab/Visual Cue: Yellow wristband and red socks/Bed in lowest position, wheels locked, appropriate side rails in place/Call bell, personal items and telephone in reach/Instruct patient to call for assistance before getting out of bed or chair/Non-slip footwear when patient is out of bed/[place] to call system/Physically safe environment - no spills, clutter or unnecessary equipment/Purposeful Proactive Rounding/Room/bathroom lighting operational, light cord in reach

## 2023-08-15 ENCOUNTER — APPOINTMENT (OUTPATIENT)
Dept: CT IMAGING | Facility: CLINIC | Age: 63
End: 2023-08-15

## 2023-08-17 ENCOUNTER — RESULT REVIEW (OUTPATIENT)
Age: 63
End: 2023-08-17

## 2023-08-17 ENCOUNTER — APPOINTMENT (OUTPATIENT)
Dept: ULTRASOUND IMAGING | Facility: CLINIC | Age: 63
End: 2023-08-17
Payer: MEDICARE

## 2023-08-17 PROCEDURE — 76536 US EXAM OF HEAD AND NECK: CPT

## 2023-09-07 ENCOUNTER — OUTPATIENT (OUTPATIENT)
Dept: OUTPATIENT SERVICES | Facility: HOSPITAL | Age: 63
LOS: 1 days | End: 2023-09-07
Payer: MEDICARE

## 2023-09-07 VITALS
RESPIRATION RATE: 18 BRPM | DIASTOLIC BLOOD PRESSURE: 81 MMHG | TEMPERATURE: 97 F | WEIGHT: 154.98 LBS | HEART RATE: 72 BPM | SYSTOLIC BLOOD PRESSURE: 117 MMHG | HEIGHT: 63 IN | OXYGEN SATURATION: 97 %

## 2023-09-07 DIAGNOSIS — F31.9 BIPOLAR DISORDER, UNSPECIFIED: ICD-10-CM

## 2023-09-07 DIAGNOSIS — K05.6 PERIODONTAL DISEASE, UNSPECIFIED: ICD-10-CM

## 2023-09-07 DIAGNOSIS — Z98.890 OTHER SPECIFIED POSTPROCEDURAL STATES: Chronic | ICD-10-CM

## 2023-09-07 DIAGNOSIS — K02.62 DENTAL CARIES ON SMOOTH SURFACE PENETRATING INTO DENTIN: ICD-10-CM

## 2023-09-07 DIAGNOSIS — Z01.818 ENCOUNTER FOR OTHER PREPROCEDURAL EXAMINATION: ICD-10-CM

## 2023-09-07 DIAGNOSIS — K02.9 DENTAL CARIES, UNSPECIFIED: ICD-10-CM

## 2023-09-07 PROCEDURE — G0463: CPT

## 2023-09-07 RX ORDER — QUETIAPINE FUMARATE 200 MG/1
1 TABLET, FILM COATED ORAL
Refills: 0 | DISCHARGE

## 2023-09-07 RX ORDER — CETIRIZINE HYDROCHLORIDE 10 MG/1
1 TABLET ORAL
Refills: 0 | DISCHARGE

## 2023-09-07 RX ORDER — FLUTICASONE PROPIONATE 220 MCG
1 AEROSOL WITH ADAPTER (GRAM) INHALATION
Refills: 0 | DISCHARGE

## 2023-09-07 RX ORDER — CLONAZEPAM 1 MG
1 TABLET ORAL
Refills: 0 | DISCHARGE

## 2023-09-07 RX ORDER — DIAZEPAM 5 MG
1 TABLET ORAL
Refills: 0 | DISCHARGE

## 2023-09-07 RX ORDER — FINASTERIDE 5 MG/1
1 TABLET, FILM COATED ORAL
Refills: 0 | DISCHARGE

## 2023-09-07 RX ORDER — HALOPERIDOL DECANOATE 100 MG/ML
0.5 INJECTION INTRAMUSCULAR
Refills: 0 | DISCHARGE

## 2023-09-07 RX ORDER — ALFUZOSIN HYDROCHLORIDE 10 MG/1
1 TABLET, EXTENDED RELEASE ORAL
Refills: 0 | DISCHARGE

## 2023-09-07 RX ORDER — HYDROXYZINE HCL 10 MG
1 TABLET ORAL
Refills: 0 | DISCHARGE

## 2023-09-07 RX ORDER — HALOPERIDOL DECANOATE 100 MG/ML
1 INJECTION INTRAMUSCULAR
Refills: 0 | DISCHARGE

## 2023-09-07 RX ORDER — HYDROXYZINE HCL 10 MG
2 TABLET ORAL
Refills: 0 | DISCHARGE

## 2023-09-07 RX ORDER — SODIUM CHLORIDE 9 MG/ML
3 INJECTION INTRAMUSCULAR; INTRAVENOUS; SUBCUTANEOUS EVERY 8 HOURS
Refills: 0 | Status: DISCONTINUED | OUTPATIENT
Start: 2023-09-21 | End: 2023-09-21

## 2023-09-07 RX ORDER — LIDOCAINE HCL 20 MG/ML
0.2 VIAL (ML) INJECTION ONCE
Refills: 0 | Status: DISCONTINUED | OUTPATIENT
Start: 2023-09-21 | End: 2023-09-21

## 2023-09-07 RX ORDER — LITHIUM CARBONATE 300 MG/1
2 TABLET, EXTENDED RELEASE ORAL
Refills: 0 | DISCHARGE

## 2023-09-07 RX ORDER — TRAZODONE HCL 50 MG
0 TABLET ORAL
Refills: 0 | DISCHARGE

## 2023-09-07 NOTE — H&P PST ADULT - PROBLEM SELECTOR PLAN 1
Pt. is scheduled for Stage III comprehensive dental treatment under anesthesia on 9/21/23.  Preop instructions reviewed with aide from [facility], whom verbalized understanding.  Pt. unable to cooperate for comprehensive exam/preop lab work today.  Please obtain copy of last medical evaluation for PST chart.

## 2023-09-07 NOTE — H&P PST ADULT - ASSESSMENT
DASI score: 4.64  DASI activity: walks, climbs stairs, ADL's  Loose teeth or denture: staff denies  Mallampati: unable to assess

## 2023-09-07 NOTE — H&P PST ADULT - HISTORY OF PRESENT ILLNESS
62 yo M with PMHx significant for Intellectual Disability, Autism, Impulse Control Disorder, Bipolar Disorder, BPH, Thyroid nodules, sp Colonoscopy, and Lyme disease who presents to [facility] prior to scheduled Comprehensive Dental Treatment under Anesthesia on 6/21/23.   7/14/2023: addendum: pt presents to [facility] accompanied by group [place] staff, that above mentioned procedure was done without complication on 6/21/2023, now pt is scheduled for stage II dental procedure on 7/20.  discussed case with Dr. Munoz, no need for another set of blood work nor CXR.*    *legal guardian is sister Esme Schroeder  [phone] who will give telephone consent for anesthesia. *   62 yo M with PMHx significant for Intellectual Disability, Autism, Impulse Control Disorder, Bipolar Disorder, BPH, Thyroid nodules, s/p Colonoscopy, and Lyme disease who presents to [facility] prior to scheduled for Stage III Comprehensive Dental Treatment under Anesthesia on 9/21/23.  Of note pt had previous dental evaluation/treatment under anesthesia on both 6/21/23 and 7/20/23.       9/7/23 addendum: pt presents to [facility] accompanied by group home staff, pt very agitated, yelling, clapping today, unable to cooperate for comprehensive exam.  Staff member noted that the pt. has had previous procedures done without complications.  Case discussed with Dr. Munoz, no need for repeat lab work, nor CXR    *Legal guardian is sister Esme Schroeder  [phone] who will give telephone consent for anesthesia. *   62 yo M with PMHx significant for Intellectual Disability, Autism, Impulse Control Disorder, Bipolar Disorder, BPH, Thyroid nodules, s/p Colonoscopy, and Lyme disease who presents to PST today for presurgical evaluation.  He has had previous dental evaluation/treatment under anesthesia on 6/21/23 and 7/20/23, which were well tolerated as per aide from [facility].  He is now scheduled for Stage III Comprehensive Dental Treatment under Anesthesia on 9/21/23.      Of note, pt. was agitated, yelling, rocking, clapping today, unable to cooperate for comprehensive exam or venipuncture.  Will request copy of most recent medical evaluation for PST chart.      *Legal guardian is sister Esme Schroeder  [phone] who will give telephone consent for anesthesia. *

## 2023-09-07 NOTE — H&P PST ADULT - GASTROINTESTINAL COMMENTS
pt. tolerates puree diet/thickened fluids- staff denies any recent aspiration pt unable to cooperate for exam

## 2023-09-20 ENCOUNTER — TRANSCRIPTION ENCOUNTER (OUTPATIENT)
Age: 63
End: 2023-09-20

## 2023-09-21 ENCOUNTER — OUTPATIENT (OUTPATIENT)
Dept: OUTPATIENT SERVICES | Facility: HOSPITAL | Age: 63
LOS: 1 days | End: 2023-09-21
Payer: MEDICARE

## 2023-09-21 ENCOUNTER — TRANSCRIPTION ENCOUNTER (OUTPATIENT)
Age: 63
End: 2023-09-21

## 2023-09-21 VITALS
DIASTOLIC BLOOD PRESSURE: 69 MMHG | HEART RATE: 66 BPM | RESPIRATION RATE: 16 BRPM | TEMPERATURE: 97 F | HEIGHT: 63 IN | SYSTOLIC BLOOD PRESSURE: 125 MMHG | WEIGHT: 154.98 LBS | OXYGEN SATURATION: 100 %

## 2023-09-21 VITALS
OXYGEN SATURATION: 96 % | DIASTOLIC BLOOD PRESSURE: 60 MMHG | SYSTOLIC BLOOD PRESSURE: 115 MMHG | RESPIRATION RATE: 16 BRPM | HEART RATE: 74 BPM

## 2023-09-21 DIAGNOSIS — Z98.890 OTHER SPECIFIED POSTPROCEDURAL STATES: Chronic | ICD-10-CM

## 2023-09-21 DIAGNOSIS — K05.6 PERIODONTAL DISEASE, UNSPECIFIED: ICD-10-CM

## 2023-09-21 DIAGNOSIS — K02.62 DENTAL CARIES ON SMOOTH SURFACE PENETRATING INTO DENTIN: ICD-10-CM

## 2023-09-21 PROCEDURE — C1889: CPT

## 2023-09-21 PROCEDURE — D1110: CPT

## 2023-09-21 PROCEDURE — D2393: CPT

## 2023-09-21 PROCEDURE — D2332: CPT

## 2023-09-21 PROCEDURE — C9399: CPT

## 2023-09-21 DEVICE — SURGICEL 2 X 14": Type: IMPLANTABLE DEVICE | Site: ORAL, DENTAL | Status: FUNCTIONAL

## 2023-09-21 RX ORDER — FENTANYL CITRATE 50 UG/ML
25 INJECTION INTRAVENOUS
Refills: 0 | Status: DISCONTINUED | OUTPATIENT
Start: 2023-09-21 | End: 2023-09-21

## 2023-09-21 RX ORDER — ONDANSETRON 8 MG/1
4 TABLET, FILM COATED ORAL ONCE
Refills: 0 | Status: DISCONTINUED | OUTPATIENT
Start: 2023-09-21 | End: 2023-09-21

## 2023-09-21 RX ORDER — SODIUM CHLORIDE 9 MG/ML
1000 INJECTION, SOLUTION INTRAVENOUS
Refills: 0 | Status: DISCONTINUED | OUTPATIENT
Start: 2023-09-21 | End: 2023-09-21

## 2023-09-21 NOTE — PRE-ANESTHESIA EVALUATION ADULT - NSANTHTOTALSCORECAL_ENT_A_CORE
Pt here for feraheme infusion, offering no complaints  IV placed with positive blood return noted  Tolerated infusion without incident  PIV removed  B12 injection given in the right deltoid  AVS given  Walked out in stable condition  2

## 2023-09-21 NOTE — ASU PATIENT PROFILE, ADULT - FALL HARM RISK - HARM RISK INTERVENTIONS

## 2023-09-21 NOTE — ASU DISCHARGE PLAN (ADULT/PEDIATRIC) - ASU DC SPECIAL INSTRUCTIONSFT
comprehensive dental treatment under general anesthesia, periodontal and restorative tx performed     no extrcations     tylenol or motrin for the next few days    drink plenty of fluids for hydration     no straw for two days       see Dr Lo on tuesday 9/26 at 1:30 pm appt is [place] [facility]    extractions were performed to each side as medically necessary, more tx to be performed in the near future

## 2023-09-21 NOTE — PRE-ANESTHESIA EVALUATION ADULT - NSANTHPEFT_GEN_ALL_CORE
s1 s2 cta b/l Prednisone Counseling:  I discussed with the patient the risks of prolonged use of prednisone including but not limited to weight gain, insomnia, osteoporosis, mood changes, diabetes, susceptibility to infection, glaucoma and high blood pressure.  In cases where prednisone use is prolonged, patients should be monitored with blood pressure checks, serum glucose levels and an eye exam.  Additionally, the patient may need to be placed on GI prophylaxis, PCP prophylaxis, and calcium and vitamin D supplementation and/or a bisphosphonate.  The patient verbalized understanding of the proper use and the possible adverse effects of prednisone.  All of the patient's questions and concerns were addressed.

## 2023-10-27 ENCOUNTER — APPOINTMENT (OUTPATIENT)
Dept: CT IMAGING | Facility: CLINIC | Age: 63
End: 2023-10-27
Payer: MEDICARE

## 2023-10-27 PROCEDURE — 70450 CT HEAD/BRAIN W/O DYE: CPT | Mod: MH

## 2023-10-29 ENCOUNTER — NON-APPOINTMENT (OUTPATIENT)
Age: 63
End: 2023-10-29

## 2024-01-02 ENCOUNTER — NON-APPOINTMENT (OUTPATIENT)
Age: 64
End: 2024-01-02

## 2024-01-30 ENCOUNTER — OFFICE (OUTPATIENT)
Dept: URBAN - METROPOLITAN AREA CLINIC 38 | Facility: CLINIC | Age: 64
Setting detail: OPHTHALMOLOGY
End: 2024-01-30
Payer: MEDICARE

## 2024-01-30 DIAGNOSIS — H57.02: ICD-10-CM

## 2024-01-30 DIAGNOSIS — H02.831: ICD-10-CM

## 2024-01-30 DIAGNOSIS — H16.223: ICD-10-CM

## 2024-01-30 DIAGNOSIS — H02.834: ICD-10-CM

## 2024-01-30 PROCEDURE — 99213 OFFICE O/P EST LOW 20 MIN: CPT | Performed by: OPTOMETRIST

## 2024-01-30 ASSESSMENT — CONFRONTATIONAL VISUAL FIELD TEST (CVF)
OS_FINDINGS: FULL
OD_FINDINGS: FULL

## 2024-01-30 ASSESSMENT — LID POSITION - DERMATOCHALASIS
OS_DERMATOCHALASIS: T
OD_DERMATOCHALASIS: T

## 2024-01-30 ASSESSMENT — SUPERFICIAL PUNCTATE KERATITIS (SPK)
OS_SPK: 1+
OD_SPK: 1+

## 2024-06-11 ENCOUNTER — APPOINTMENT (OUTPATIENT)
Dept: UROLOGY | Facility: CLINIC | Age: 64
End: 2024-06-11

## 2024-09-17 NOTE — H&P PST ADULT - ALCOHOL USE HISTORY SINGLE SELECT
Quality 130: Documentation Of Current Medications In The Medical Record: Current Medications Documented
Detail Level: Detailed
never

## 2024-11-13 ENCOUNTER — APPOINTMENT (OUTPATIENT)
Dept: ULTRASOUND IMAGING | Facility: CLINIC | Age: 64
End: 2024-11-13
Payer: MEDICARE

## 2024-11-13 PROCEDURE — 76770 US EXAM ABDO BACK WALL COMP: CPT

## 2025-02-17 ENCOUNTER — OFFICE (OUTPATIENT)
Dept: URBAN - METROPOLITAN AREA CLINIC 38 | Facility: CLINIC | Age: 65
Setting detail: OPHTHALMOLOGY
End: 2025-02-17
Payer: MEDICARE

## 2025-02-17 DIAGNOSIS — H02.834: ICD-10-CM

## 2025-02-17 DIAGNOSIS — Z96.1: ICD-10-CM

## 2025-02-17 DIAGNOSIS — H47.20: ICD-10-CM

## 2025-02-17 DIAGNOSIS — H57.02: ICD-10-CM

## 2025-02-17 DIAGNOSIS — H02.831: ICD-10-CM

## 2025-02-17 DIAGNOSIS — H16.223: ICD-10-CM

## 2025-02-17 PROCEDURE — 92014 COMPRE OPH EXAM EST PT 1/>: CPT

## 2025-02-17 ASSESSMENT — VISUAL ACUITY
OS_BCVA: F&F
OD_BCVA: F&F

## 2025-02-17 ASSESSMENT — LID POSITION - DERMATOCHALASIS
OD_DERMATOCHALASIS: T
OS_DERMATOCHALASIS: T

## 2025-02-17 ASSESSMENT — SUPERFICIAL PUNCTATE KERATITIS (SPK)
OD_SPK: 1+
OS_SPK: 1+

## 2025-02-17 ASSESSMENT — KERATOMETRY: METHOD_AUTO_MANUAL: AUTO

## 2025-07-12 NOTE — ASU PREOP CHECKLIST - HEART RATE (BEATS/MIN)
ASSESSMENT/ PLAN:    Injury of left great toe, initial encounter (Primary) with Subungual hematoma of great toe of left foot, initial encounter  X-rays of the toe were negative for an underlying fracture.  A Bovie high-temperature fine-tipped cautery device was used to evacuate the subungual hematoma.  The patient tolerated the procedure well and postprocedure cares were reviewed prior to discharge from the urgent care.    - XR TOE(S) 2 OR MORE VIEWS LEFT; Future      See Patient Instruction section  Return if symptoms worsen or fail to improve.    ___________________________________________________  SUBJECTIVE:  Yg is a 19 year old male who is seen for Urgent Care evaluation of a left great toe injury that occurred on 07/07/2025.  He dropped a jar onto his toe while barefoot.  He experienced immediate pain.  He notes that the area under the great toenail became discolored the night of the injury and worsened over time.  See photo.    Nursing notes reviewed and accepted.     REVIEW OF SYSTEMS:  In addition to that noted above, review of systems is remarkable for:  None    There is no problem list on file for this patient.    MEDICATIONS and HISTORY reviewed and updated in the electronic health record.  ALLERGIES:   Allergen Reactions   • Mupirocin RASH        OBJECTIVE:  Visit Vitals  /80 (BP Location: RFA - Right forearm, Patient Position: Sitting, Cuff Size: Regular)   Pulse (!) 50   Temp 98.4 °F (36.9 °C) (Oral)   Resp 16   Ht 6' 2\" (1.88 m)   Wt 102.1 kg (225 lb)   SpO2 99%   BMI 28.89 kg/m²     General - alert, cooperative  Extremities -examination of the left foot reveals localized swelling to the left great toe with tenderness to palpation and 100% of the nailbed affected by a subungual hematoma  Skin - There are no other bruises, rashes or lesions that appear significant that are visible.        X-rays of the left great toe were negative for fracture.    Procedure:  Subungual hematoma  evacuation  Verbal permission obtained from the patient.  A Bovie cautery high-temperature fine-tipped device was used to open a hole in the left great toenail.  Blood was then drained and the patient experienced a relief of the previous pressure/pain in the area.  Following the drainage procedure, a Telfa island dressing was placed over the site.  Postprocedure cares were discussed.      EPIC chart reviewed today with the patient.   66

## (undated) DEVICE — MEDICATION LABELS W MARKER

## (undated) DEVICE — GLV 6.5 PROTEXIS (WHITE)

## (undated) DEVICE — GOWN TRIMAX LG

## (undated) DEVICE — DRAPE MAYO STAND 30"

## (undated) DEVICE — LAP PAD 18 X 18"

## (undated) DEVICE — VENODYNE/SCD SLEEVE CALF LARGE

## (undated) DEVICE — SOL IRR POUR H2O 250ML

## (undated) DEVICE — POSITIONER FOAM EGG CRATE ULNAR 2PCS (PINK)

## (undated) DEVICE — GLV 7 PROTEXIS (WHITE)

## (undated) DEVICE — DRAPE LIGHT HANDLE COVER (BLUE)

## (undated) DEVICE — SOL IRR POUR NS 0.9% 500ML

## (undated) DEVICE — DRAPE INSTRUMENT POUCH 6.75" X 11"

## (undated) DEVICE — VAGINAL PACKING 2 X 6"

## (undated) DEVICE — VISITEC 4X4

## (undated) DEVICE — PACK BASIC GOWN

## (undated) DEVICE — FOLEY TRAY 16FR 5CC LTX UMETER CLOSED

## (undated) DEVICE — WARMING BLANKET LOWER ADULT

## (undated) DEVICE — GLV 7.5 PROTEXIS (WHITE)

## (undated) DEVICE — SPECIMEN CONTAINER 100ML